# Patient Record
Sex: MALE | Race: WHITE
[De-identification: names, ages, dates, MRNs, and addresses within clinical notes are randomized per-mention and may not be internally consistent; named-entity substitution may affect disease eponyms.]

---

## 2017-06-02 NOTE — UC
Laceration HPI





- HPI Summary


HPI Summary: 





top of head scraped with a falling piece of metal about 2 hours ago-abrasion on 

top of head no loc





- History Of Current Complaint


Chief Complaint: UCHeadInjury


Stated Complaint: HEAD INJURY & LAC


Time Seen by Provider: 06/02/17 19:40


Hx Obtained From: Patient


Laceration Location: Head


Mechanism Of Injury: Blunt Trauma


Onset/Duration: Sudden Onset


Severity: Mild


Pain Intensity: 2


Pain Scale Used: 0-10 Numeric





- Allergies/Home Medications


Allergies/Adverse Reactions: 


 Allergies











Allergy/AdvReac Type Severity Reaction Status Date / Time


 


No Known Allergies Allergy   Verified 06/02/17 19:27














PMH/Surg Hx/FS Hx/Imm Hx


Previously Healthy: Yes





- Surgical History


Surgical History: None





- Family History


Known Family History: Positive: None





- Social History


Occupation: Employed Full-time


Lives: With Family


Alcohol Use: Occasionally


Substance Use Type: None


Smoking Status (MU): Never Smoked Tobacco





Review of Systems


Constitutional: Negative


Skin: Other - laceration 2 and 1/2 inch on top of head,, no bleeding


Eyes: Negative


ENT: Negative


Respiratory: Negative


Cardiovascular: Negative


Gastrointestinal: Negative


Genitourinary: Negative


Motor: Negative


Neurovascular: Negative


Musculoskeletal: Negative


Neurological: Negative


Psychological: Negative


All Other Systems Reviewed And Are Negative: Yes





Physical Exam


Triage Information Reviewed: Yes


Appearance: Well-Appearing, No Pain Distress, Well-Nourished


Vital Signs: 


 Initial Vital Signs











Temp  97.8 F   06/02/17 19:21


 


Pulse  77   06/02/17 19:21


 


Resp  20   06/02/17 19:21


 


BP  118/73   06/02/17 19:21


 


Pulse Ox  97   06/02/17 19:21











Vital Signs Reviewed: Yes


Eye Exam: Normal


Eyes: Positive: Conjunctiva Clear


ENT Exam: Normal


ENT: Positive: Normal ENT inspection, Hearing grossly normal, Pharynx normal, 

TMs normal.  Negative: Nasal congestion, Nasal drainage, Tonsillar swelling, 

Tonsillar exudate, Trismus, Muffled/hoarse voice


Dental Exam: Normal


Neck exam: Normal


Neck: Positive: Supple, Nontender, No Lymphadenopathy


Respiratory Exam: Normal


Respiratory: Positive: Chest non-tender, Lungs clear, Normal breath sounds, No 

respiratory distress, No accessory muscle use


Cardiovascular Exam: Normal


Cardiovascular: Positive: RRR, No Murmur, Pulses Normal, Brisk Capillary Refill


Musculoskeletal Exam: Normal


Musculoskeletal: Positive: Strength Intact, ROM Intact, No Edema


Neurological Exam: Normal


Neurological: Positive: Alert, Muscle Tone Normal, Fatigued


Psychological Exam: Normal


Skin Exam: Normal


Skin: Positive: Other - 2 and 1/2 laceration o top of head





Laceration Repair





- Laceration Repair


  ** 1


Description: Linear


Laceration Size After Repair: Length (cm) - 3, Width (mm) - 1


Modified For Repair: No


Cleansing Completed Via Routine Prep: Yes


Irrigation With Pressure Irrigation Device: Yes


Closure Material: Skin Adhesive





Laceration Course/Dx





- Course/Dx


Course Of Treatment: skin glue instructions, head injury, update tetnus follow 

with pcp prn





- Differential Dx - Laceration/Wound


Differental Diagnoses: Laceration


Provider Diagnoses: 2 and 1/2 inch laceration with skin glue, update tetnus, 

head injury





Discharge





- Discharge Plan


Condition: Stable


Disposition: HOME


Patient Education Materials:  Diphtheria/Acellular Pertussis/Tetanus Booster 

Vaccine (By injection), Head Injury (ED), Skin Adhesive Care (ED)


Referrals: 


[facility] [place] PHYSICIAN REFERRAL [Outside] - If Needed

## 2018-02-27 NOTE — UC
FLU HPI





- HPI Summary


HPI Summary: 





43 y/o male presents to the urgent care c/o sore throat, B/L ear pain, dry cough

, nasal congestion, chills since Friday 2/23/2018. Pt has taken Nyquill to 

alleviate symptoms. Pain is 2/10 w/ swallowing. Pt denies fever, SOB, chest pain

, abdominal pain, N/V/D


y.





- History of Current Complaint


Chief Complaint: UCRespiratory


Stated Complaint: SORE THROAT


Time Seen by Provider: 02/27/18 21:24


Hx Obtained From: Patient


Onset/Duration: Gradual Onset, Lasting Days - 5 days, Still Present


Severity Currently: Mild


Severity Initially: Mild


Pain Intensity: 2


Pain Scale Used: 0-10 Numeric


Associated Signs & Symptoms: Positive: Myalgia, Cough, Sore Throat, Nasal 

Congestion





- Risk Factors


Influenza Risk Factors: Negative





- Allergy/Home Medications


Allergies/Adverse Reactions: 


 Allergies











Allergy/AdvReac Type Severity Reaction Status Date / Time


 


No Known Allergies Allergy   Verified 02/27/18 20:32











Home Medications: 


 Home Medications





guaiFENesin [Mucinex] 600 mg PO DAILY PRN 02/27/18 [History Confirmed 02/27/18]











PMH/Surg Hx/FS Hx/Imm Hx


Previously Healthy: Yes


Other Cancer History: Lymphoma





- Surgical History


Surgical History: None





- Family History


Known Family History: Positive: None - Pt denies FMHX





- Social History


Occupation: Employed Full-time


Lives: With Family


Alcohol Use: Occasionally


Substance Use Type: None


Smoking Status (MU): Never Smoked Tobacco





Review of Systems


Constitutional: Chills, Fatigue


Skin: Negative


Eyes: Negative


ENT: Sore Throat, Nasal Discharge, Sinus Congestion


Respiratory: Cough


Cardiovascular: Negative


Gastrointestinal: Negative


Genitourinary: Negative


Motor: Negative


Neurovascular: Negative


Musculoskeletal: Negative


Neurological: Negative


Psychological: Negative


Is Patient Immunocompromised?: No


All Other Systems Reviewed And Are Negative: Yes





Physical Exam


Triage Information Reviewed: Yes


Vital Signs: 


 Initial Vital Signs











Temp  98.5 F   02/27/18 20:29


 


Pulse  104   02/27/18 20:29


 


Resp  16   02/27/18 20:29


 


BP  134/86   02/27/18 20:29


 


Pulse Ox  98   02/27/18 20:29














- Additional Comments





VITAL SIGNS: Reviewed. 


GENERAL:  Patient is a well developed and nourished female who is sitting 

comfortable in the examining table.  Patient is not in any acute respiratory 

distress. 


HEAD AND FACE: No signs of trauma.  No ecchymosis, hematomas or skull 

depressions. No sinus tenderness. edematous erythematous nasal mucosa with 

yellowish discharge, 


EYES: PERRLA, EOMI x 2, No injected conjunctiva, clear watery eyes, no 

nystagmus. No photophobia.


EARS: Hearing grossly intact. Ear canals and tympanic membranes are within 

normal limits. 


MOUTH: Positive pharynx with erythema, no exudates,no  palatal petechiae. no B/

L tonsillar enlargement  Uvula in midline. 


NECK: Supple, trachea is midline, Positive anterior cervical lymphadenopathy, 

no JVD, no carotid bruit, no c-spine tenderness, neck with full ROM. No 

meningeal signs, no Kernig's or brudzinskis signs. 


CHEST: Symmetric, no tenderness at palpation 


LUNGS: Clear to auscultation bilaterally. No wheezing or crackles.


CVS: Regular rate and rhythm, S1 and S2 present, no murmurs or gallops 

appreciated. 


ABDOMEN: Soft, non-tender. No signs of distention. No rebound no guarding, and 

no masses palpated. Bowel sounds are normal. 


EXTREMITIES: FROM in all major joints, no edema, no cyanosis or clubbing.


NEURO: Alert and oriented x 3. No acute neurological deficits. Speech is normal 

and follows commands. 


SKIN: Dry and warm 











Flu Course/Dx





- Course


Course Of Treatment: 43 y/o male presents to the urgent care c/o sore throat, B/

L ear pain, dry cough, nasal congestion, chills since Friday 2/23/2018. Pt has 

taken Nyquill to alleviate symptoms. Pain is 2/10 w/ swallowing. Pt denies fever

, SOB, chest pain, abdominal pain, N/V/D. Hx obtained. Pt with pharyngitis on 

examination. Rapid strep ordered, result: negative.Influenza A&B ordered: result

: negative. Pt Rx  ibuprofen PO to alleviates symptoms. Advised on hand washin. 

Pt advised to rest, increase fluid intake, eat well and avoid strenuous 

exercise. If symptoms do not improve or worsen advised to return to the urgent 

care or f/u with her PCP for further evaluation and treatment. Pt understood 

and agreed with plan of care.





- Differential Dx/Diagnosis


Differential Diagnosis/HQI/PQRI: Bronchitis, Influenza, Upper Respiratory 

Infection, Other - pharyngitis


Provider Diagnoses: 1- Upper respiratory infection





Discharge





- Discharge Plan


Condition: Stable


Disposition: HOME


Prescriptions: 


Ibuprofen TAB* [Motrin TAB* 800 MG] 800 mg PO Q6H PRN #20 tab


 PRN Reason: Sore Throat


Patient Education Materials:  Upper Respiratory Infection (ED)


Referrals: 


[facility] [place] PHYSICIAN REFERRAL [Outside] - If Needed


Additional Instructions: 


1-Please take ibuprofen PO q6-8hrs prn as instructed after meals to alleviate 

pain and swelling. Increase fluid intake, eat well, rest and avoid strenuous 

exercise


2-If symptoms do not improve or worsen please return to the urgent care or f/u 

with your PCP for further evaluation and treatment.

## 2018-03-08 ENCOUNTER — HOSPITAL ENCOUNTER (EMERGENCY)
Dept: HOSPITAL 25 - UCEAST | Age: 43
Discharge: HOME | End: 2018-03-08
Payer: COMMERCIAL

## 2018-03-08 VITALS — SYSTOLIC BLOOD PRESSURE: 118 MMHG | DIASTOLIC BLOOD PRESSURE: 79 MMHG

## 2018-03-08 DIAGNOSIS — J32.9: Primary | ICD-10-CM

## 2018-03-08 DIAGNOSIS — J02.9: ICD-10-CM

## 2018-03-08 PROCEDURE — 99212 OFFICE O/P EST SF 10 MIN: CPT

## 2018-03-08 PROCEDURE — G0463 HOSPITAL OUTPT CLINIC VISIT: HCPCS

## 2018-03-08 NOTE — ED
Throat Pain/Nasal Congestion





- HPI Summary


HPI Summary: 


42M presents with worsening sinus congestion for past couple days.  He has had 

a cold that was improving for two weeks.  He states he seemed to be getting 

over it when the sinus congestion, post nasal drip, and sore throat got worst 

two days ago.  He admits to a headache.  He admits to ear pressure.  He states 

eyes felt "glazed this morning" but that has resolved. He denies any fever.  He 

states his cough has improved. He denies any chest pain or SOB. He denies any 

abdominal pain, n/v. 








- History of Current Complaint


Chief Complaint: UCRespiratory


Time Seen by Provider: 03/08/18 09:22





- Allergies/Home Medications


Allergies/Adverse Reactions: 


 Allergies











Allergy/AdvReac Type Severity Reaction Status Date / Time


 


No Known Allergies Allergy   Verified 03/08/18 09:16














PMH/Surg Hx/FS Hx/Imm Hx


Endocrine/Hematology History: 


   Denies: Hx Anticoagulant Therapy


Respiratory History: 


   Denies: Hx Chronic Obstructive Pulmonary Disease (COPD)





- Cancer History


Cancer Type, Location and Year: lymphoma


Infectious Disease History: No


Infectious Disease History: 


   Denies: Traveled Outside the [place] in Last 30 Days





- Family History


Known Family History: Positive: None - Pt denies FMHX





- Social History


Alcohol Use: Occasionally


Substance Use Type: Reports: None


Smoking Status (MU): Never Smoked Tobacco





Review of Systems


Negative: Fever


Positive: Sore Throat, Nasal Discharge


Negative: Chest Pain


Negative: Shortness Of Breath


All Other Systems Reviewed And Are Negative: Yes





Physical Exam


Triage Information Reviewed: Yes


Vital Signs On Initial Exam: 


 Initial Vitals











Temp Pulse Resp BP Pulse Ox


 


 96.8 F   91   16   118/79   99 


 


 03/08/18 09:17  03/08/18 09:17  03/08/18 09:17  03/08/18 09:17  03/08/18 09:17











Vital Signs Reviewed: Yes


Appearance: Positive: Well-Appearing


Skin: Positive: Warm, Dry


Head/Face: Positive: Normal Head/Face Inspection


Eyes: Positive: Normal, EOMI, GABRIEL, Conjunctiva Clear


ENT: Positive: Normal ENT inspection, Pharyngeal erythema, Nasal congestion, 

TMs normal, Sinus tenderness, Uvula midline, Other - soft palate symmetric.  

Negative: Tonsillar swelling, Tonsillar exudate, Trismus, Muffled voice


Neck: Positive: Supple, Nontender, No Lymphadenopathy


Respiratory/Lung Sounds: Positive: Clear to Auscultation, Breath Sounds Present


Cardiovascular: Positive: Normal, RRR


Abdomen Description: Positive: Nontender, Soft


Bowel Sounds: Positive: Present


Musculoskeletal: Positive: Normal


Neurological: Positive: Normal


Psychiatric: Positive: Normal





Diagnostics





- Vital Signs


 Vital Signs











  Temp Pulse Resp BP Pulse Ox


 


 03/08/18 09:17  96.8 F  91  16  118/79  99














- Laboratory


Lab Statement: Any lab studies that have been ordered have been reviewed, and 

results considered in the medical decision making process.





EENT Course/Dx





- Course


Course Of Treatment: 42M presents with worsening sinus congestion for past 

couple days.  He has had a cold that was improving for two weeks.  He states he 

seemed to be getting over it when the sinus congestion, post nasal drip, and 

sore throat got worst two days ago.  He admits to a headache.  He admits to ear 

pressure.  He states eyes felt "glazed this morning" but that has resolved. He 

denies any fever.  He states his cough has improved. He denies any chest pain 

or SOB. He denies any abdominal pain, n/v. on exam has sinus tenderness and 

nasal congestion present. lungs CTA. will treat as sinusitis with augmentin. 

will have restart flonase has at home for post nasal drip. patient understand 

and agrees with plan.





- Differential Diagnoses


Differential Diagnoses: Pharyngitis, Sinusitis, URI/Bronchitis





- Diagnoses


Provider Diagnoses: 


 Sinusitis








Discharge





- Discharge Plan


Condition: Good


Disposition: HOME


Prescriptions: 


Amoxicillin/Clavulanate TAB* [Augmentin  mg*] 500 mg PO BID #20 tab


Patient Education Materials:  Sinusitis (ED)


Referrals: 


[facility] [place] PHYSICIAN REFERRAL [Outside]


Additional Instructions: 


Take antibiotic twice a day for 10 days


Restart your intranasal steroid


Use saline spray in nose as much as needed


Use OTC decongestions


Establish care with primary


Return to ED with any new or worsening symptoms